# Patient Record
Sex: MALE | Race: ASIAN | URBAN - METROPOLITAN AREA
[De-identification: names, ages, dates, MRNs, and addresses within clinical notes are randomized per-mention and may not be internally consistent; named-entity substitution may affect disease eponyms.]

---

## 2024-09-26 ENCOUNTER — OFFICE VISIT (OUTPATIENT)
Dept: OTOLARYNGOLOGY | Facility: CLINIC | Age: 69
End: 2024-09-26

## 2024-09-26 VITALS — WEIGHT: 145 LBS

## 2024-09-26 DIAGNOSIS — H61.21 IMPACTED CERUMEN OF RIGHT EAR: Primary | ICD-10-CM

## 2024-09-26 DIAGNOSIS — H91.8X3 ASYMMETRICAL HEARING LOSS: ICD-10-CM

## 2024-09-26 DIAGNOSIS — H60.501 ACUTE OTITIS EXTERNA OF RIGHT EAR, UNSPECIFIED TYPE: ICD-10-CM

## 2024-09-26 DIAGNOSIS — H72.92 PERFORATION OF LEFT TYMPANIC MEMBRANE: ICD-10-CM

## 2024-09-26 PROCEDURE — 69210 REMOVE IMPACTED EAR WAX UNI: CPT | Performed by: STUDENT IN AN ORGANIZED HEALTH CARE EDUCATION/TRAINING PROGRAM

## 2024-09-26 PROCEDURE — 99203 OFFICE O/P NEW LOW 30 MIN: CPT | Performed by: STUDENT IN AN ORGANIZED HEALTH CARE EDUCATION/TRAINING PROGRAM

## 2024-09-26 RX ORDER — OFLOXACIN 3 MG/ML
SOLUTION/ DROPS OPHTHALMIC
Qty: 5 ML | Refills: 0 | Status: SHIPPED | OUTPATIENT
Start: 2024-09-26

## 2024-09-26 NOTE — PROGRESS NOTES
Elizabeth Hall is a 68 year old male.   Chief Complaint   Patient presents with    Ear Problem     Patient is here for ear pain on both side reports difficult to hear x 1 month.     HPI:   68-year-old presents with pain in both of his ears worse on the right side.  Has longstanding hearing loss on the left and wears a hearing aid in his right ear    Current Outpatient Medications   Medication Sig Dispense Refill    ofloxacin 0.3 % Ophthalmic Solution Apply to affected ear 5 drops twice a day x 5 days 5 mL 0      History reviewed. No pertinent past medical history.   Social History:  Social History     Socioeconomic History    Marital status:    Tobacco Use    Smoking status: Never     Passive exposure: Never    Smokeless tobacco: Never   Vaping Use    Vaping status: Never Used   Substance and Sexual Activity    Alcohol use: Never    Drug use: Never      History reviewed. No pertinent surgical history.      EXAM:   Wt 145 lb (65.8 kg)     System Details   Skin Inspection - Normal.   Constitutional Overall appearance - Normal.   Head/Face Symmetric, TMJ tenderness not present    Eyes EOMI, PERRL   Right ear:  Canal with cerumen and the ear canal was erythematous, TM intact, no SHARI   Left ear:  Canal clear, TM intact, no SHARI   Nose: Septum midline, inferior turbinates not enlarged, nasal valves without collapse    Oral cavity/Oropharynx: No lesions or masses on inspection or palpation, tonsils symmetric    Neck: Soft without LAD, thyroid not enlarged  Voice clear/ no stridor   Other:      SCOPES AND PROCEDURES:     Canals:  Right: Canal with cerumen preventing adequate view of TM, debrided with instrumentation    Tympanic Membranes:  Right: Normal tympanic membrane.     TM Visualized Method:   Right TM examined via otomicroscopy.      PROCEDURE:   Removal of cerumen impaction   The cerumen impaction was completely removed on the right side using microscopy as necessary.   Removal was completed by using a  curette and suction.     AUDIOGRAM AND IMAGING:         IMPRESSION:   1. Impacted cerumen of right ear    2. Acute otitis externa of right ear, unspecified type    3. Perforation of left tympanic membrane    4. Asymmetrical hearing loss       Recommendations:  -Right ear with cerumen impaction and irritated ear canal skin and eardrum likely a low-grade bacterial otitis externa from water exposure in the setting of the cerumen  -The left ear is dry there is a tympanic membrane perforation that appears to be stable  -Will prescribe ofloxacin otic drops for this right ear discussed cerumen and hearing aid precautions and care    The patient indicates understanding of these issues and agrees to the plan.      Leeroy Garcia MD  9/26/2024  2:30 PM